# Patient Record
Sex: MALE
[De-identification: names, ages, dates, MRNs, and addresses within clinical notes are randomized per-mention and may not be internally consistent; named-entity substitution may affect disease eponyms.]

---

## 2017-08-24 ENCOUNTER — HOSPITAL ENCOUNTER (EMERGENCY)
Dept: HOSPITAL 82 - ED | Age: 32
Discharge: HOME | DRG: 605 | End: 2017-08-24
Payer: COMMERCIAL

## 2017-08-24 VITALS — WEIGHT: 221.34 LBS | BODY MASS INDEX: 30.99 KG/M2 | HEIGHT: 71 IN

## 2017-08-24 VITALS — SYSTOLIC BLOOD PRESSURE: 131 MMHG | DIASTOLIC BLOOD PRESSURE: 73 MMHG

## 2017-08-24 DIAGNOSIS — S61.412A: Primary | ICD-10-CM
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
## 2017-08-24 PROCEDURE — 0HQGXZZ REPAIR LEFT HAND SKIN, EXTERNAL APPROACH: ICD-10-PCS | Performed by: EMERGENCY MEDICINE

## 2020-09-30 ENCOUNTER — HOSPITAL ENCOUNTER (EMERGENCY)
Dept: HOSPITAL 82 - ED | Age: 35
Discharge: HOME | DRG: 605 | End: 2020-09-30
Payer: SELF-PAY

## 2020-09-30 VITALS — SYSTOLIC BLOOD PRESSURE: 138 MMHG | DIASTOLIC BLOOD PRESSURE: 70 MMHG

## 2020-09-30 VITALS — WEIGHT: 220.46 LBS | BODY MASS INDEX: 30.86 KG/M2 | HEIGHT: 71 IN
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
## 2020-09-30 DIAGNOSIS — S20.312A: ICD-10-CM
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
## 2020-09-30 DIAGNOSIS — S41.112A: Primary | ICD-10-CM
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
## 2020-09-30 PROCEDURE — 0HQCXZZ REPAIR LEFT UPPER ARM SKIN, EXTERNAL APPROACH: ICD-10-PCS | Performed by: EMERGENCY MEDICINE

## 2021-02-18 ENCOUNTER — HOSPITAL ENCOUNTER (EMERGENCY)
Dept: HOSPITAL 82 - ED | Age: 36
Discharge: HOME | DRG: 603 | End: 2021-02-18
Payer: COMMERCIAL

## 2021-02-18 VITALS — HEIGHT: 71 IN | WEIGHT: 235.89 LBS | BODY MASS INDEX: 33.02 KG/M2

## 2021-02-18 VITALS — SYSTOLIC BLOOD PRESSURE: 132 MMHG | DIASTOLIC BLOOD PRESSURE: 72 MMHG
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
## 2021-02-18 DIAGNOSIS — S81.812A: ICD-10-CM

## 2021-02-18 DIAGNOSIS — L03.116: Primary | ICD-10-CM
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
## 2021-02-18 LAB
ANION GAP SERPL CALCULATED.3IONS-SCNC: 14 MMOL/L
BASOPHILS NFR BLD AUTO: 0 % (ref 0–3)
BUN SERPL-MCNC: 11 MG/DL (ref 9–20)
BUN/CREAT SERPL: 14
CHLORIDE SERPL-SCNC: 99 MMOL/L (ref 95–108)
CO2 SERPL-SCNC: 26 MMOL/L (ref 22–30)
CREAT SERPL-MCNC: 0.8 MG/DL (ref 0.7–1.3)
EOSINOPHIL NFR BLD AUTO: 0 % (ref 0–8)
ERYTHROCYTE [DISTWIDTH] IN BLOOD BY AUTOMATED COUNT: 13.3 % (ref 11.5–15.5)
HCT VFR BLD AUTO: 42.5 % (ref 39–50)
HGB BLD-MCNC: 14.3 G/DL (ref 14–18)
IMM GRANULOCYTES NFR BLD: 0.5 % (ref 0–5)
LYMPHOCYTES NFR BLD: 12 % (ref 15–41)
MCH RBC QN AUTO: 30.5 PG  CALC (ref 26–32)
MCHC RBC AUTO-ENTMCNC: 33.6 G/DL CAL (ref 32–36)
MCV RBC AUTO: 90.6 FL  CALC (ref 80–100)
MONOCYTES NFR BLD AUTO: 5 % (ref 2–13)
NEUTROPHILS # BLD AUTO: 12.11 THOU/UL (ref 1.82–7.42)
NEUTROPHILS NFR BLD AUTO: 82 % (ref 42–76)
PLATELET # BLD AUTO: 222 THOU/UL (ref 130–400)
POTASSIUM SERPL-SCNC: 4.3 MMOL/L (ref 3.5–5.1)
RBC # BLD AUTO: 4.69 MILL/UL (ref 4.7–6.1)
SODIUM SERPL-SCNC: 135 MMOL/L (ref 137–146)

## 2021-02-20 ENCOUNTER — HOSPITAL ENCOUNTER (EMERGENCY)
Dept: HOSPITAL 82 - ED | Age: 36
Discharge: HOME | DRG: 605 | End: 2021-02-20
Payer: COMMERCIAL

## 2021-02-20 VITALS — DIASTOLIC BLOOD PRESSURE: 77 MMHG | SYSTOLIC BLOOD PRESSURE: 117 MMHG

## 2021-02-20 VITALS — BODY MASS INDEX: 33.4 KG/M2 | HEIGHT: 71 IN | WEIGHT: 238.54 LBS
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
## 2021-02-20 DIAGNOSIS — S80.812A: Primary | ICD-10-CM

## 2021-02-20 DIAGNOSIS — L03.116: ICD-10-CM

## 2021-02-20 LAB
ALBUMIN SERPL-MCNC: 4 G/DL (ref 3.2–5)
ALP SERPL-CCNC: 157 U/L (ref 38–126)
ANION GAP SERPL CALCULATED.3IONS-SCNC: 13 MMOL/L
AST SERPL-CCNC: 58 U/L (ref 17–59)
BASOPHILS NFR BLD AUTO: 0 % (ref 0–3)
BUN SERPL-MCNC: 7 MG/DL (ref 9–20)
BUN/CREAT SERPL: 10
CHLORIDE SERPL-SCNC: 100 MMOL/L (ref 95–108)
CO2 SERPL-SCNC: 28 MMOL/L (ref 22–30)
CREAT SERPL-MCNC: 0.7 MG/DL (ref 0.7–1.3)
EOSINOPHIL NFR BLD AUTO: 2 % (ref 0–8)
ERYTHROCYTE [DISTWIDTH] IN BLOOD BY AUTOMATED COUNT: 13.2 % (ref 11.5–15.5)
HCT VFR BLD AUTO: 41.2 % (ref 39–50)
HGB BLD-MCNC: 13.5 G/DL (ref 14–18)
IMM GRANULOCYTES NFR BLD: 0.3 % (ref 0–5)
LYMPHOCYTES NFR BLD: 22 % (ref 15–41)
MCH RBC QN AUTO: 30.1 PG  CALC (ref 26–32)
MCHC RBC AUTO-ENTMCNC: 32.8 G/DL CAL (ref 32–36)
MCV RBC AUTO: 91.8 FL  CALC (ref 80–100)
MONOCYTES NFR BLD AUTO: 6 % (ref 2–13)
NEUTROPHILS # BLD AUTO: 7.11 THOU/UL (ref 1.82–7.42)
NEUTROPHILS NFR BLD AUTO: 70 % (ref 42–76)
PLATELET # BLD AUTO: 251 THOU/UL (ref 130–400)
POTASSIUM SERPL-SCNC: 4 MMOL/L (ref 3.5–5.1)
PROT SERPL-MCNC: 7.4 G/DL (ref 6.3–8.2)
RBC # BLD AUTO: 4.49 MILL/UL (ref 4.7–6.1)
SODIUM SERPL-SCNC: 137 MMOL/L (ref 137–146)

## 2021-02-21 ENCOUNTER — HOSPITAL ENCOUNTER (OUTPATIENT)
Dept: HOSPITAL 82 - ED | Age: 36
Setting detail: OBSERVATION
LOS: 2 days | Discharge: HOME | DRG: 603 | End: 2021-02-23
Attending: INTERNAL MEDICINE | Admitting: INTERNAL MEDICINE
Payer: COMMERCIAL

## 2021-02-21 VITALS — BODY MASS INDEX: 31.48 KG/M2 | HEIGHT: 71 IN | WEIGHT: 224.87 LBS

## 2021-02-21 VITALS — DIASTOLIC BLOOD PRESSURE: 96 MMHG | SYSTOLIC BLOOD PRESSURE: 162 MMHG

## 2021-02-21 DIAGNOSIS — L03.116: ICD-10-CM
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
## 2021-02-21 DIAGNOSIS — B95.4: ICD-10-CM

## 2021-02-21 DIAGNOSIS — S80.812A: ICD-10-CM

## 2021-02-21 DIAGNOSIS — Z20.822: ICD-10-CM
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
## 2021-02-21 DIAGNOSIS — L02.416: Primary | ICD-10-CM

## 2021-02-21 LAB
ALBUMIN SERPL-MCNC: 4.1 G/DL (ref 3.2–5)
ALP SERPL-CCNC: 171 U/L (ref 38–126)
ANION GAP SERPL CALCULATED.3IONS-SCNC: 14 MMOL/L
AST SERPL-CCNC: 71 U/L (ref 17–59)
BASOPHILS NFR BLD AUTO: 0 % (ref 0–3)
BUN SERPL-MCNC: 6 MG/DL (ref 9–20)
BUN/CREAT SERPL: 9
CHLORIDE SERPL-SCNC: 103 MMOL/L (ref 95–108)
CO2 SERPL-SCNC: 24 MMOL/L (ref 22–30)
CREAT SERPL-MCNC: 0.7 MG/DL (ref 0.7–1.3)
EOSINOPHIL NFR BLD AUTO: 3 % (ref 0–8)
ERYTHROCYTE [DISTWIDTH] IN BLOOD BY AUTOMATED COUNT: 13.2 % (ref 11.5–15.5)
HCT VFR BLD AUTO: 40.8 % (ref 39–50)
HGB BLD-MCNC: 13.5 G/DL (ref 14–18)
IMM GRANULOCYTES NFR BLD: 0.2 % (ref 0–5)
LYMPHOCYTES NFR BLD: 31 % (ref 15–41)
MCH RBC QN AUTO: 30.3 PG  CALC (ref 26–32)
MCHC RBC AUTO-ENTMCNC: 33.1 G/DL CAL (ref 32–36)
MCV RBC AUTO: 91.5 FL  CALC (ref 80–100)
MONOCYTES NFR BLD AUTO: 6 % (ref 2–13)
NEUTROPHILS # BLD AUTO: 4.93 THOU/UL (ref 1.82–7.42)
NEUTROPHILS NFR BLD AUTO: 59 % (ref 42–76)
PLATELET # BLD AUTO: 294 THOU/UL (ref 130–400)
POTASSIUM SERPL-SCNC: 4 MMOL/L (ref 3.5–5.1)
PROT SERPL-MCNC: 7.5 G/DL (ref 6.3–8.2)
RBC # BLD AUTO: 4.46 MILL/UL (ref 4.7–6.1)
SODIUM SERPL-SCNC: 137 MMOL/L (ref 137–146)

## 2021-02-21 PROCEDURE — G0378 HOSPITAL OBSERVATION PER HR: HCPCS

## 2021-02-21 NOTE — NUR
Admission Note
 
Report Given to:         LUCY MCLEOD
Transported by:           Wheelchair          X Stretcher
 
Transported with:        X Nurse     Transporter   X Patent IV    O2
                          Cardiac Monitor
 
Location:                 ICU      X MS2

## 2021-02-21 NOTE — NUR
PATIENT RESTNIG IN BED, WATCHING TV. VANCO INFUSING TO RAC, NO S/S OF
INFILTRATION OR ADVERSE REACTION AT THIS TIME. PATIENT TOLERATING WELL. WCTM.

## 2021-02-21 NOTE — NUR
2000-patient arrived to unit via stretcher, on room air, accompanied by ZEINAB Omalley RN.  Ambulated to bed with steady gait, denies pain to left leg, has an
abrasion to left leg, states that ER doctor opened and drained the wound,
dressing in in place is CDI, on left lower extremety, shin area.  Arrived with
vancomycin infusing, 20G RCA patent, no telemetry, states live at home with
family, and work in the [place], driving a machine.  Patient states fell
out of the machine at work and hit on the left lower extremity, shin area.
2030-Vancomycin infusion completed, hep lock iv.  NO pain reported, assessment
completed at this time, educated about plan of care, safety measures, hourly
rounding, and call bell system, patient voices understanding, call bell a
reach.  Will follow up with hourly rounding.

## 2021-02-21 NOTE — NUR
RECEIVED REPORT FROM GORDON ARGUELLO. PATIENT MOVED TO ROOM 6 TO BE MORE COMFORTABLE
DURING VANCO INFUSION. WILL MONITOR. BED LOCKED AND IN LOWEST POSITION, SIDE
RAILS UP X2 FOR SAFETY. CALL BELL IN REACH. S.O. AT BEDSIDE.

## 2021-02-21 NOTE — NUR
PT MOVED TO ER BED 6 FOR HIGHER CARE. IV INITIATED, LABS DRAWN, IV ANTIBIOTICS
STARTED AND IM ROCEPHIN GIVEN AS PER MLP ORDERS. PT TOLERATED WELL. CALL LIGHT
WITHIN REACH. REPORT GIVEN TO MICHAEL MCLEOD AND CARE RELINQUISHED.

## 2021-02-21 NOTE — NUR
IN ROOM INTRODUCED SELF TO PT. NO C/O AT THIS TIME. IV ABT. INFUSING WELL, NO
REDNESS OR EDEMA NOTED. PT. MADE AWARE OF ADMISSION, VERBALIZED UNDERSTANDING.

## 2021-02-21 NOTE — NUR
patient is resting in bed, cefepime infusing at this time, will follow up with
frequently roundings. iv site is patent.

## 2021-02-22 VITALS — DIASTOLIC BLOOD PRESSURE: 56 MMHG | SYSTOLIC BLOOD PRESSURE: 100 MMHG

## 2021-02-22 VITALS — DIASTOLIC BLOOD PRESSURE: 66 MMHG | SYSTOLIC BLOOD PRESSURE: 115 MMHG

## 2021-02-22 VITALS — DIASTOLIC BLOOD PRESSURE: 66 MMHG | SYSTOLIC BLOOD PRESSURE: 119 MMHG

## 2021-02-22 VITALS — SYSTOLIC BLOOD PRESSURE: 125 MMHG | DIASTOLIC BLOOD PRESSURE: 69 MMHG

## 2021-02-22 LAB
ANION GAP SERPL CALCULATED.3IONS-SCNC: 14 MMOL/L
BASOPHILS NFR BLD AUTO: 0 % (ref 0–3)
BUN SERPL-MCNC: 5 MG/DL (ref 9–20)
BUN/CREAT SERPL: 9
CHLORIDE SERPL-SCNC: 103 MMOL/L (ref 95–108)
CO2 SERPL-SCNC: 24 MMOL/L (ref 22–30)
CREAT SERPL-MCNC: 0.6 MG/DL (ref 0.7–1.3)
EOSINOPHIL NFR BLD AUTO: 1 % (ref 0–8)
ERYTHROCYTE [DISTWIDTH] IN BLOOD BY AUTOMATED COUNT: 13 % (ref 11.5–15.5)
HCT VFR BLD AUTO: 38.9 % (ref 39–50)
HGB BLD-MCNC: 12.9 G/DL (ref 14–18)
IMM GRANULOCYTES NFR BLD: 0.5 % (ref 0–5)
LYMPHOCYTES NFR BLD: 23 % (ref 15–41)
MCH RBC QN AUTO: 30.4 PG  CALC (ref 26–32)
MCHC RBC AUTO-ENTMCNC: 33.2 G/DL CAL (ref 32–36)
MCV RBC AUTO: 91.5 FL  CALC (ref 80–100)
MONOCYTES NFR BLD AUTO: 4 % (ref 2–13)
NEUTROPHILS # BLD AUTO: 5.96 THOU/UL (ref 1.82–7.42)
NEUTROPHILS NFR BLD AUTO: 72 % (ref 42–76)
PLATELET # BLD AUTO: 306 THOU/UL (ref 130–400)
POTASSIUM SERPL-SCNC: 4.5 MMOL/L (ref 3.5–5.1)
RBC # BLD AUTO: 4.25 MILL/UL (ref 4.7–6.1)
SODIUM SERPL-SCNC: 137 MMOL/L (ref 137–146)

## 2021-02-22 PROCEDURE — 0H9LXZZ DRAINAGE OF LEFT LOWER LEG SKIN, EXTERNAL APPROACH: ICD-10-PCS

## 2021-02-22 NOTE — NUR
RECEIVED REPORT FOBENNY NURSE CHARITO, PATIENT ALERT ORIENTED MOSTLY Nepalese, DENIES
PAIN AT THIS TIME, WITH SALINE LOCK ON RAC PATENT FLUSHES WELL,BREATHING EVEN
UNLABORED LBM 2/22,LEFT LEG TRACE OF CHIDI WARM TO TOUCH, STRONG PEDAL PULSE
DRESSING ON LEFT LEG CDI DONE BY WOUNDCARE DOCTOR,CALL LIGHT AT REACH.

## 2021-02-22 NOTE — NUR
S:  BOBBY BACA is a 35 M who presents with cellulitis of the the lower
left extremity.
 
He denies any past medical history. All medications in patient's
chart were reviewed.
 
O:
 
VS: /66 mmHg, P 77 beats per minute, RR 18 breathes per minute, T 97.7 F
 
W 102.001 kg, HT 70 in, Scr= 0.6 mg/dL, CrCl= 250 mL/min
 
A:
 
Wound culture is pending.
 
P:
 
Patient is on vancomycin 1250 mg IV Q8H and cefepime 1 g IV Q12H.
 
Vancomycin ordered for pharmacy to dose.
 
Start Vancomycin 1250 mg IV Q8H. Vancomycin trough is drawn before the 4th
dose on 2/22/21 @ 2130.
 
Vancomycin goal trough is between 10-15 mcg/ml.
 
Pharmacy will follow and or advise on antibiotics use as needed.

## 2021-02-22 NOTE — NUR
PATIENT IS RESTING IN BED, NO CHANGES NOTED FROM PREVIOUS ASSESSMENT, DENIES
PAIN, OR NEED FOR PAIN MEDICATION, WILL FOLLOW UP CLOSELY, CALL OLIVIER AT REACH.

## 2021-02-22 NOTE — NUR
PT LAYING IN BED A&O X3. NO DISTRESS NOTED. PT DENIES ANY PAIN, 0/10. #20 LAC
INFUSING WITH SCHEDULED VANCOMYCIN  ML/HR. IV APPEARS HEALTHY AN
D PATENT. LLE ELEVATED ON PILLOW. DRESSING TO LT RED CDI. EXPLAINED TO PT
THAT DRESSING WAS GOING TO BE REMOVED AND PHOTO OBTAINED, PT AGREEABLE PT
REPORTS HAVING A SMALL "ABRAISON" THAT OCCURED WHEN HE WAS WORKING. REPORTS
IMPROVEMENT WITH SWELLING. LLE WARM TO TOUCH. EDEMA NOTED TO EXTREMITY AND
ANKLE. NO OTHER NEEDS AT THIS TIME. ASSESSMENT COMPLETED. DISCUSSED POC. CALL
LIGHT LEFT WITHIN REACH.

## 2021-02-22 NOTE — NUR
patient is resting in bed with eyes closed, arouses easily to stimuli, no pain
or needs reported at this time; refused scheduled medication, call bell is at
reach, will follow up closely.

## 2021-02-23 VITALS — SYSTOLIC BLOOD PRESSURE: 111 MMHG | DIASTOLIC BLOOD PRESSURE: 63 MMHG

## 2021-02-23 VITALS — DIASTOLIC BLOOD PRESSURE: 77 MMHG | SYSTOLIC BLOOD PRESSURE: 126 MMHG

## 2021-02-23 VITALS — DIASTOLIC BLOOD PRESSURE: 54 MMHG | SYSTOLIC BLOOD PRESSURE: 106 MMHG

## 2021-02-23 LAB
ALBUMIN SERPL-MCNC: 4 G/DL (ref 3.2–5)
ALP SERPL-CCNC: 165 U/L (ref 38–126)
ANION GAP SERPL CALCULATED.3IONS-SCNC: 15 MMOL/L
AST SERPL-CCNC: 56 U/L (ref 17–59)
BASOPHILS NFR BLD AUTO: 1 % (ref 0–3)
BUN SERPL-MCNC: 11 MG/DL (ref 9–20)
BUN/CREAT SERPL: 15
CHLORIDE SERPL-SCNC: 106 MMOL/L (ref 95–108)
CO2 SERPL-SCNC: 23 MMOL/L (ref 22–30)
CREAT SERPL-MCNC: 0.7 MG/DL (ref 0.7–1.3)
EOSINOPHIL NFR BLD AUTO: 4 % (ref 0–8)
ERYTHROCYTE [DISTWIDTH] IN BLOOD BY AUTOMATED COUNT: 13.2 % (ref 11.5–15.5)
HCT VFR BLD AUTO: 45.5 % (ref 39–50)
HGB BLD-MCNC: 14.5 G/DL (ref 14–18)
IMM GRANULOCYTES NFR BLD: 0.5 % (ref 0–5)
LYMPHOCYTES NFR BLD: 47 % (ref 15–41)
MCH RBC QN AUTO: 29.8 PG  CALC (ref 26–32)
MCHC RBC AUTO-ENTMCNC: 31.9 G/DL CAL (ref 32–36)
MCV RBC AUTO: 93.6 FL  CALC (ref 80–100)
MONOCYTES NFR BLD AUTO: 5 % (ref 2–13)
NEUTROPHILS # BLD AUTO: 2.82 THOU/UL (ref 1.82–7.42)
NEUTROPHILS NFR BLD AUTO: 43 % (ref 42–76)
PLATELET # BLD AUTO: 351 THOU/UL (ref 130–400)
POTASSIUM SERPL-SCNC: 4.7 MMOL/L (ref 3.5–5.1)
PROT SERPL-MCNC: 7.4 G/DL (ref 6.3–8.2)
RBC # BLD AUTO: 4.86 MILL/UL (ref 4.7–6.1)
SODIUM SERPL-SCNC: 140 MMOL/L (ref 137–146)

## 2021-02-23 NOTE — NUR
PT EDUCATED ON DISCHARGE INSTRUCTIONS AND NEW MEDICATIONS. PT INFORMED ON NEED
TO FINISH PREVIOUSLY PRESCRIBED KEFLEX AND BACTRIUM AND THE TAKE ADDITIONAL 5
DAYS OF BACTRUIM.FOLLOW UP WITH WOUND CARE AND PCP, CARD FOR DR MARTIN
PROVIDED IF PT WAS INTRESTED.  PT VERBLAIZED UNDERSTANDING. TRANSLATION
PROVIDED BY STAFF.  PT VERBLAIZED UNDERSTANDING. WAITING FOR COMPLETION OF IV
ANTIBIOTICS BEFORE DC. ALL SAFETY PRECAUTIONS ARE IN PLACE. WILL CONTINUE TO
MONITOR.

## 2021-02-23 NOTE — NUR
PT RESTING IN SEMI FOWLERS POSITION. RESPIRATIONS ARE EVEN AND UNLABORED ON
ROOM AIR. DRESSING TO LEFT LEG REMAINS CDI, ELEVATED WITH PILLOWS X2. PT
DENIES OF ANY PAINS OR DISCOMFORTS. ALL SAFETY PRECAUTIONS ARE IN PLACE WITH
CALL LIGHT IN REACH. WILL CONTINUE TO MONITOR.

## 2021-02-23 NOTE — NUR
IV ANTIBIOTIC COMPLETED. #20G IN RAC REMOVED WITH CATAHTER STILL INTACT. PT
TOELRATED WELL. DRESSING TO LEFT LEG REMAINS CDI. WAITING FOR TRANSPORTATION
AT THIS TIME. WILL CONTINUE TO MONITOR.

## 2021-02-23 NOTE — NUR
Discharge instructions given. Patient verbalizes understanding of same.
Discharged in stable condition via Wheelchair to Home with
family. All belongings sent with pt.
 
PT DICHARGED HOME WITH ALL DISCHARGE INSTRUCTIONS AND BELONINGS IN STABLE
CONDITION VIA WHEELCHAIR ACCOMPAINED BY MARIELENA ALMANZA

## 2021-02-23 NOTE — NUR
PT RESTING IN SEMI FOWLERS POSITION. PT IS A/O X3, MOSTLY English SPEAKING BUT
UNDERSTANDING ENGLISH WELL. ASSESSMENT AND VITALS COMPLETED. /63, HR 69,
O2 97% ON ROOM AIR. RESPIRATIONS ARE EVEN AND UNLABORED WITH NO DISTRESS
NOTED. HEART RHYTHM NORMAL. BOWEL SOUNDS ARE ACTIVE. RADIAL AND PEDAL PULSES
ARE STRONG. #20G IN RAC FLUSHED, SITE APPEARS HEALTHY AND PATENT. DRESSING TO
LEFT CALF REMAINS CDI. SURROUNDING SKIN IS REDDENED AND WARM TO TOUCH. PT
DENIES OF ANY PAINS OR DISCOMFORTS. ALL SAFETY PRECAUTIONS ARE IN PLACE WITH
CALL LIGHT IN REACH. INSTRUCTED PT TO CALL FOR ASSISTANCE. WILL CONTINUE TO
MONITOR.

## 2022-10-10 ENCOUNTER — HOSPITAL ENCOUNTER (EMERGENCY)
Dept: HOSPITAL 82 - ED | Age: 37
Discharge: HOME | DRG: 603 | End: 2022-10-10
Payer: SELF-PAY

## 2022-10-10 VITALS — SYSTOLIC BLOOD PRESSURE: 115 MMHG | DIASTOLIC BLOOD PRESSURE: 58 MMHG

## 2022-10-10 VITALS — WEIGHT: 257.94 LBS | BODY MASS INDEX: 36.11 KG/M2 | HEIGHT: 71 IN

## 2022-10-10 DIAGNOSIS — L03.116: Primary | ICD-10-CM
